# Patient Record
Sex: FEMALE | Race: WHITE | NOT HISPANIC OR LATINO | Employment: UNEMPLOYED | URBAN - METROPOLITAN AREA
[De-identification: names, ages, dates, MRNs, and addresses within clinical notes are randomized per-mention and may not be internally consistent; named-entity substitution may affect disease eponyms.]

---

## 2017-07-14 ENCOUNTER — OFFICE VISIT (OUTPATIENT)
Dept: ORTHOPEDICS | Facility: CLINIC | Age: 42
End: 2017-07-14
Payer: COMMERCIAL

## 2017-07-14 VITALS
DIASTOLIC BLOOD PRESSURE: 80 MMHG | HEIGHT: 67 IN | WEIGHT: 199 LBS | BODY MASS INDEX: 31.23 KG/M2 | HEART RATE: 75 BPM | SYSTOLIC BLOOD PRESSURE: 124 MMHG

## 2017-07-14 DIAGNOSIS — S83.282A TEAR OF LATERAL MENISCUS OF LEFT KNEE, CURRENT, UNSPECIFIED TEAR TYPE, INITIAL ENCOUNTER: Primary | ICD-10-CM

## 2017-07-14 DIAGNOSIS — M25.561 CHRONIC PAIN OF RIGHT KNEE: ICD-10-CM

## 2017-07-14 DIAGNOSIS — G89.29 CHRONIC PAIN OF RIGHT KNEE: ICD-10-CM

## 2017-07-14 DIAGNOSIS — S83.271A COMPLEX TEAR OF LATERAL MENISCUS OF RIGHT KNEE AS CURRENT INJURY, INITIAL ENCOUNTER: Primary | ICD-10-CM

## 2017-07-14 DIAGNOSIS — M23.000 CYST OF LATERAL MENISCUS OF RIGHT KNEE: ICD-10-CM

## 2017-07-14 PROCEDURE — 73562 X-RAY EXAM OF KNEE 3: CPT | Mod: RT,,, | Performed by: ORTHOPAEDIC SURGERY

## 2017-07-14 PROCEDURE — 99203 OFFICE O/P NEW LOW 30 MIN: CPT | Mod: ,,, | Performed by: ORTHOPAEDIC SURGERY

## 2017-07-14 RX ORDER — GABAPENTIN 300 MG/1
1 CAPSULE ORAL NIGHTLY
COMMUNITY

## 2017-07-14 RX ORDER — TRAMADOL HYDROCHLORIDE 50 MG/1
50 TABLET ORAL EVERY 12 HOURS PRN
Qty: 60 TABLET | Refills: 0 | Status: SHIPPED | OUTPATIENT
Start: 2017-07-14 | End: 2017-08-02

## 2017-07-14 NOTE — PROGRESS NOTES
Subjective:       Chief Complaint    Chief Complaint   Patient presents with    Knee Pain     NP, Right Knee.  Pain started approx. in 2015 with no known trauma.  Previous MRI done on 5/31/17 @ [facility] but patient did not bring the disc.  Xrays were also done but not brought to this visit.  No previous P.T.  Steroid injection was done & helped some.       HPI  Nga Horton is a 41 y.o.  female who presents With intermittent pain in the right knee. Pain is gone progressively worse. Recent MRI showed a complex tear of the lateral meniscus with a meniscal cyst. Day-to-day pain level is 8/10. She had a steroid injection in the right knee, but that did not help.      Past History  Past Medical History:   Diagnosis Date    Arthritis     Back pain     Depression     Kidney stone     Neck pain      Past Surgical History:   Procedure Laterality Date    HYSTERECTOMY      right foot  04/2004    infection    TUBAL LIGATION       Social History     Social History    Marital status:      Spouse name: N/A    Number of children: N/A    Years of education: N/A     Occupational History    Not on file.     Social History Main Topics    Smoking status: Current Every Day Smoker     Packs/day: 0.50     Types: Cigarettes    Smokeless tobacco: Never Used    Alcohol use No    Drug use: No    Sexual activity: Not on file     Other Topics Concern    Not on file     Social History Narrative    No narrative on file         Medications  Current Outpatient Prescriptions   Medication Sig    ascorbic acid (VITAMIN C) 500 MG tablet Take 500 mg by mouth once daily.    black cohosh 20 mg Tab Take 20 mg by mouth once daily.     cetirizine (ZYRTEC) 10 MG tablet Take 10 mg by mouth once daily.    cholecalciferol, vitamin D3, (VITAMIN D3) 1,000 unit capsule Take 1,000 Units by mouth once daily.    citalopram (CELEXA) 20 MG tablet Take 20 mg by mouth once daily.     ergocalciferol (VITAMIN D2) 50,000 unit Cap  Take 50,000 Units by mouth every 7 days.    estradiol (ESTRACE) 0.5 MG tablet Take 0.5 mg by mouth once daily.    fish oil-omega-3 fatty acids 300-1,000 mg capsule Take 2 g by mouth once daily.    gabapentin (NEURONTIN) 300 MG capsule Take 1 capsule by mouth every evening.    hydrocodone-acetaminophen 10-325mg (NORCO)  mg Tab Take 1 tablet by mouth 2 (two) times daily.     ibuprofen (ADVIL,MOTRIN) 800 MG tablet Take 1 tablet (800 mg total) by mouth every 8 (eight) hours as needed for Pain. (Patient taking differently: Take 800 mg by mouth every 6 (six) hours as needed for Pain. )    LACTOBACILLUS COMBO NO.13 (PROBIOTIC PEARLS COMPLETE ORAL) Take by mouth.    pravastatin (PRAVACHOL) 40 MG tablet Take 40 mg by mouth once daily.    promethazine (PHENERGAN) 25 MG tablet     vitamin A 99359 UNIT capsule Take 10,000 Units by mouth once daily.    vitamin E 1000 UNIT capsule Take 1,000 Units by mouth once daily.     Current Facility-Administered Medications   Medication    gentamicin 80 mg/100ml NACL IVPB IVPB 80 mg       Allergies  Review of patient's allergies indicates:  No Known Allergies      Review of Systems     Constitutional: Negative    HENT: Negative  Eyes: Negative  Respiratory: Negative  Cardiovascular: Negative  Musculoskeletal: HPI  Skin: Negative  Neurological: Negative  Hematological: Negative  Endocrine: Negative      Physical Exam    Vitals:    07/14/17 1337   BP: 124/80   Pulse: 75     Physical Examination:Right knee-0 120° plus cystic mass laterally. Discomfort at lateral joint line. No swelling. Stable knee. Negative Lachman's, negative pivot shift. Collateral ligaments intact. There is crepitance at the patellofemoral joint.     Skin-  General appearance -  well appearing, and in no distress  Mental status - awake  Neck - supple  Chest -  symmetric air entry  Heart - normal rate   Abdomen - soft      Assessment/Plan   Complex tear of lateral meniscus of right knee as current injury,  initial encounter    Chronic pain of right knee  -     X-Ray Knee AP LAT with De Motte Right    MRI report available-by Dr. Shyam Dixon. Complex tear of the lateral meniscus with 12 mm meniscal cyst. Plain x-rays of the right knee showed a lateral tilt to the patella on the right. The left patellofemoral joint was normal looking. No evidence of fracture or dislocation. AP view showed mild narrowing of the medial compartment. Overall, mild varus alignment to the knee.   She will be scheduled for arthroscopic procedure of the right knee and excision of the meniscal cyst.    This note was dictated using voice recognition software and may contain grammatical errors.

## 2017-07-20 DIAGNOSIS — M23.000: ICD-10-CM

## 2017-07-20 DIAGNOSIS — S83.271A COMPLEX TEAR OF LATERAL MENISCUS OF RIGHT KNEE: Primary | ICD-10-CM

## 2017-08-02 ENCOUNTER — OFFICE VISIT (OUTPATIENT)
Dept: ORTHOPEDICS | Facility: CLINIC | Age: 42
End: 2017-08-02
Payer: COMMERCIAL

## 2017-08-02 VITALS
HEIGHT: 67 IN | DIASTOLIC BLOOD PRESSURE: 75 MMHG | BODY MASS INDEX: 31.23 KG/M2 | WEIGHT: 199 LBS | HEART RATE: 72 BPM | SYSTOLIC BLOOD PRESSURE: 118 MMHG

## 2017-08-02 DIAGNOSIS — S83.271D COMPLEX TEAR OF LATERAL MENISCUS OF RIGHT KNEE AS CURRENT INJURY, SUBSEQUENT ENCOUNTER: ICD-10-CM

## 2017-08-02 DIAGNOSIS — M23.000 CYST OF LATERAL MENISCUS OF RIGHT KNEE: Primary | ICD-10-CM

## 2017-08-02 LAB
ALBUMIN SERPL-MCNC: 4.6 G/DL (ref 3.1–4.7)
ALP SERPL-CCNC: 63 IU/L (ref 40–104)
ALT (SGPT): 28 IU/L (ref 3–33)
AST SERPL-CCNC: 20 IU/L (ref 10–40)
BACTERIA SPEC CULT: ABNORMAL
BASOPHILS NFR BLD: 0 K/UL (ref 0–0.2)
BASOPHILS NFR BLD: 0.3 %
BILIRUB SERPL-MCNC: 0.7 MG/DL (ref 0.3–1)
BUN SERPL-MCNC: 11 MG/DL (ref 8–20)
CALCIUM SERPL-MCNC: 10.1 MG/DL (ref 7.7–10.4)
CHLORIDE: 103 MMOL/L (ref 98–110)
CO2 SERPL-SCNC: 31.2 MMOL/L (ref 22.8–31.6)
CREATININE: 0.52 MG/DL (ref 0.6–1.4)
EOSINOPHIL NFR BLD: 0.1 K/UL (ref 0–0.7)
EOSINOPHIL NFR BLD: 1.4 %
ERYTHROCYTE [DISTWIDTH] IN BLOOD BY AUTOMATED COUNT: 13.2 % (ref 11.7–14.9)
GLUCOSE: 95 MG/DL (ref 70–99)
GRAN #: 5.4 K/UL (ref 1.4–6.5)
GRAN%: 52.5 %
HCT VFR BLD AUTO: 42.9 % (ref 36–48)
HGB BLD-MCNC: 14.2 G/DL (ref 12–15)
IMMATURE GRANS (ABS): 0.1 K/UL (ref 0–1)
IMMATURE GRANULOCYTES: 0.6 %
LYMPH #: 3.8 K/UL (ref 1.2–3.4)
LYMPH%: 37.4 %
MCH RBC QN AUTO: 30.9 PG (ref 25–35)
MCHC RBC AUTO-ENTMCNC: 33.1 G/DL (ref 31–36)
MCV RBC AUTO: 93.5 FL (ref 79–98)
MONO #: 0.8 K/UL (ref 0.1–0.6)
MONO%: 7.8 %
NUCLEATED RBCS: 0 %
PLATELET # BLD AUTO: 319 K/UL (ref 140–440)
PMV BLD AUTO: 9.3 FL (ref 8.8–12.7)
POTASSIUM SERPL-SCNC: 4.4 MMOL/L (ref 3.5–5)
PROT SERPL-MCNC: 8 G/DL (ref 6–8.2)
RBC # BLD AUTO: 4.59 M/UL (ref 3.5–5.5)
RBC # BLD AUTO: ABNORMAL /HPF
SODIUM: 141 MMOL/L (ref 134–144)
SQUAMOUS EPITHELIAL, UA: ABNORMAL
WBC # BLD AUTO: 10.3 K/UL (ref 5–10)
WBC # BLD: ABNORMAL /HPF

## 2017-08-02 PROCEDURE — 99499 UNLISTED E&M SERVICE: CPT | Mod: ,,, | Performed by: ORTHOPAEDIC SURGERY

## 2017-08-02 RX ORDER — OXYCODONE AND ACETAMINOPHEN 10; 325 MG/1; MG/1
1 TABLET ORAL EVERY 4 HOURS PRN
Qty: 60 TABLET | Refills: 0 | Status: SHIPPED | OUTPATIENT
Start: 2017-08-02

## 2017-08-02 RX ORDER — CLONAZEPAM 0.5 MG/1
TABLET ORAL
COMMUNITY
Start: 2017-07-17

## 2017-08-02 NOTE — PROGRESS NOTES
Subjective:       Chief Complaint    Chief Complaint   Patient presents with    Pre-op Exam     Right knee- DOS will be on 08/07/2017 right knee arthroscopy with lateral meniscus repair and excision of meniscal cyst.  The purose is to relieve the pain of the right knee tear lateral meniscus with meniscal cyst.She brought her MRI disc since she didn't have it at her last visit. The report was given at her last visit.       MARLEN Horton is a 41 y.o.  female who presents Preop arthroscopy of the right knee. Has had progressive pain in the lateral aspect of her right knee over the past year. Is currently on pain management. Uses hydrocodone 10 mg to 4 times a day. Will be given Percocet 10 mg 1 every 4 hours when necessary pain for postoperative surgery.      Past History  Past Medical History:   Diagnosis Date    Arthritis     Back pain     Depression     Kidney stone     Neck pain      Past Surgical History:   Procedure Laterality Date    HYSTERECTOMY      right foot  04/2004    infection    TUBAL LIGATION       Social History     Social History    Marital status:      Spouse name: N/A    Number of children: N/A    Years of education: N/A     Occupational History    Not on file.     Social History Main Topics    Smoking status: Current Every Day Smoker     Packs/day: 0.50     Types: Cigarettes    Smokeless tobacco: Never Used    Alcohol use No    Drug use: No    Sexual activity: Not on file     Other Topics Concern    Not on file     Social History Narrative    No narrative on file         Medications  Current Outpatient Prescriptions   Medication Sig    ascorbic acid (VITAMIN C) 500 MG tablet Take 500 mg by mouth once daily.    black cohosh 20 mg Tab Take 20 mg by mouth once daily.     cetirizine (ZYRTEC) 10 MG tablet Take 10 mg by mouth once daily.    cholecalciferol, vitamin D3, (VITAMIN D3) 1,000 unit capsule Take 1,000 Units by mouth once daily.    citalopram (CELEXA) 20 MG  tablet Take 20 mg by mouth once daily.     clonazePAM (KLONOPIN) 0.5 MG tablet     ergocalciferol (VITAMIN D2) 50,000 unit Cap Take 50,000 Units by mouth every 7 days.    estradiol (ESTRACE) 0.5 MG tablet Take 0.5 mg by mouth once daily.    fish oil-omega-3 fatty acids 300-1,000 mg capsule Take 2 g by mouth once daily.    gabapentin (NEURONTIN) 300 MG capsule Take 1 capsule by mouth every evening.    hydrocodone-acetaminophen 10-325mg (NORCO)  mg Tab Take 1 tablet by mouth 2 (two) times daily.     ibuprofen (ADVIL,MOTRIN) 800 MG tablet Take 1 tablet (800 mg total) by mouth every 8 (eight) hours as needed for Pain. (Patient taking differently: Take 800 mg by mouth every 6 (six) hours as needed for Pain. )    LACTOBACILLUS COMBO NO.13 (PROBIOTIC PEARLS COMPLETE ORAL) Take by mouth.    pravastatin (PRAVACHOL) 40 MG tablet Take 40 mg by mouth once daily.    promethazine (PHENERGAN) 25 MG tablet     vitamin A 38140 UNIT capsule Take 10,000 Units by mouth once daily.    vitamin E 1000 UNIT capsule Take 1,000 Units by mouth once daily.    oxycodone-acetaminophen (PERCOCET)  mg per tablet Take 1 tablet by mouth every 4 (four) hours as needed for Pain.     Current Facility-Administered Medications   Medication    gentamicin 80 mg/100ml NACL IVPB IVPB 80 mg       Allergies  Review of patient's allergies indicates:  No Known Allergies      Review of Systems     Constitutional: Negative    HENT: Negative  Eyes: Negative  Respiratory: Negative  Cardiovascular: Negative  Musculoskeletal: HPI  Skin: Negative  Neurological: Negative  Hematological: Negative  Endocrine: Negative      Physical Exam    Vitals:    08/02/17 1004   BP: 118/75   Pulse: 72     Physical Examination:Right knee-range of motion 0-120°. Stable knee. Mild patellofemoral crepitance. Tender lateral joint line with bulging cyst.     Skin-  General appearance -  well appearing, and in no distress  Mental status - awake  Neck - supple  Chest  -  symmetric air entry  Heart - normal rate   Abdomen - soft      Assessment/Plan   Cyst of lateral meniscus of right knee    Complex tear of lateral meniscus of right knee as current injury, subsequent encounter    Other orders  -     oxycodone-acetaminophen (PERCOCET)  mg per tablet; Take 1 tablet by mouth every 4 (four) hours as needed for Pain.  Dispense: 60 tablet; Refill: 0      Procedure discussed. 12 point review of systems negative.      This note was dictated using voice recognition software and may contain grammatical errors.

## 2017-08-11 ENCOUNTER — OFFICE VISIT (OUTPATIENT)
Dept: ORTHOPEDICS | Facility: CLINIC | Age: 42
End: 2017-08-11
Payer: COMMERCIAL

## 2017-08-11 VITALS
SYSTOLIC BLOOD PRESSURE: 109 MMHG | BODY MASS INDEX: 31.23 KG/M2 | HEIGHT: 67 IN | DIASTOLIC BLOOD PRESSURE: 71 MMHG | WEIGHT: 199 LBS | HEART RATE: 88 BPM

## 2017-08-11 DIAGNOSIS — S83.271D COMPLEX TEAR OF LATERAL MENISCUS OF RIGHT KNEE AS CURRENT INJURY, SUBSEQUENT ENCOUNTER: Primary | ICD-10-CM

## 2017-08-11 DIAGNOSIS — Z98.890 POST-OPERATIVE STATE: ICD-10-CM

## 2017-08-11 PROCEDURE — 99024 POSTOP FOLLOW-UP VISIT: CPT | Mod: ,,, | Performed by: ORTHOPAEDIC SURGERY

## 2017-08-11 RX ORDER — MUPIROCIN 20 MG/G
OINTMENT TOPICAL
COMMUNITY
Start: 2017-08-04

## 2017-08-11 NOTE — PROGRESS NOTES
Subjective:       Chief Complaint    Chief Complaint   Patient presents with    Right Knee - Post-op Evaluation     4 days post-op. DOS: 8/8/2017, arthroscopy with partial arthroscopic lateral meniscectomy, right knee # 2-open excision of lateral cyst, right knee.  She is currently wearing a knee immobilizer.       MARLEN Horton is a 41 y.o.  female who presents Follow-up arthroscopy of the left knee. Appears to be doing reasonably well.       Past History  Past Medical History:   Diagnosis Date    Arthritis     Back pain     Depression     Kidney stone     Neck pain      Past Surgical History:   Procedure Laterality Date    HYSTERECTOMY      right foot  04/2004    infection    TUBAL LIGATION       Social History     Social History    Marital status:      Spouse name: N/A    Number of children: N/A    Years of education: N/A     Occupational History    Not on file.     Social History Main Topics    Smoking status: Current Every Day Smoker     Packs/day: 0.50     Types: Cigarettes    Smokeless tobacco: Never Used    Alcohol use No    Drug use: No    Sexual activity: Not on file     Other Topics Concern    Not on file     Social History Narrative    No narrative on file         Medications  Current Outpatient Prescriptions   Medication Sig    ascorbic acid (VITAMIN C) 500 MG tablet Take 500 mg by mouth once daily.    black cohosh 20 mg Tab Take 20 mg by mouth once daily.     cetirizine (ZYRTEC) 10 MG tablet Take 10 mg by mouth once daily.    cholecalciferol, vitamin D3, (VITAMIN D3) 1,000 unit capsule Take 1,000 Units by mouth once daily.    citalopram (CELEXA) 20 MG tablet Take 20 mg by mouth once daily.     clonazePAM (KLONOPIN) 0.5 MG tablet     ergocalciferol (VITAMIN D2) 50,000 unit Cap Take 50,000 Units by mouth every 7 days.    estradiol (ESTRACE) 0.5 MG tablet Take 0.5 mg by mouth once daily.    fish oil-omega-3 fatty acids 300-1,000 mg capsule Take 2 g by mouth once  daily.    gabapentin (NEURONTIN) 300 MG capsule Take 1 capsule by mouth every evening.    hydrocodone-acetaminophen 10-325mg (NORCO)  mg Tab Take 1 tablet by mouth 2 (two) times daily.     ibuprofen (ADVIL,MOTRIN) 800 MG tablet Take 1 tablet (800 mg total) by mouth every 8 (eight) hours as needed for Pain. (Patient taking differently: Take 800 mg by mouth every 6 (six) hours as needed for Pain. )    LACTOBACILLUS COMBO NO.13 (PROBIOTIC PEARLS COMPLETE ORAL) Take by mouth.    mupirocin (BACTROBAN) 2 % ointment     oxycodone-acetaminophen (PERCOCET)  mg per tablet Take 1 tablet by mouth every 4 (four) hours as needed for Pain.    pravastatin (PRAVACHOL) 40 MG tablet Take 40 mg by mouth once daily.    promethazine (PHENERGAN) 25 MG tablet     vitamin A 49043 UNIT capsule Take 10,000 Units by mouth once daily.    vitamin E 1000 UNIT capsule Take 1,000 Units by mouth once daily.     Current Facility-Administered Medications   Medication    gentamicin 80 mg/100ml NACL IVPB IVPB 80 mg       Allergies  Review of patient's allergies indicates:  No Known Allergies      Review of Systems     Constitutional: Negative    HENT: Negative  Eyes: Negative  Respiratory: Negative  Cardiovascular: Negative  Musculoskeletal: HPI  Skin: Negative  Neurological: Negative  Hematological: Negative  Endocrine: Negative      Physical Exam    Vitals:    08/11/17 0845   BP: 109/71   Pulse: 88     Physical Examination:Right knee: Actively bending the knee to 90°. Mild to moderate swelling. Able to straight leg raise.     Skin- clear  General appearance -  well appearing, and in no distress  Mental status - awake  Neck - supple  Chest -  symmetric air entry  Heart - normal rate   Abdomen - soft      Assessment/Plan   Complex tear of lateral meniscus of right knee as current injury, subsequent encounter    Post-operative state      Ambulating with a cane. Wants to discontinue the knee immobilizer. Ice recommended. Okay to wet  the knee. Once there is no drainage from the lateral incision. Advised to continue with the knee immobilizer when ambulating. Can remove for active exercises when necessary.      This note was dictated using voice recognition software and may contain grammatical errors.

## 2017-08-18 ENCOUNTER — OFFICE VISIT (OUTPATIENT)
Dept: ORTHOPEDICS | Facility: CLINIC | Age: 42
End: 2017-08-18
Payer: COMMERCIAL

## 2017-08-18 VITALS
HEIGHT: 67 IN | BODY MASS INDEX: 31.23 KG/M2 | DIASTOLIC BLOOD PRESSURE: 62 MMHG | HEART RATE: 64 BPM | WEIGHT: 199 LBS | SYSTOLIC BLOOD PRESSURE: 118 MMHG

## 2017-08-18 DIAGNOSIS — Z98.890 POST-OPERATIVE STATE: ICD-10-CM

## 2017-08-18 DIAGNOSIS — M23.000: ICD-10-CM

## 2017-08-18 DIAGNOSIS — S83.271D COMPLEX TEAR OF LATERAL MENISCUS OF RIGHT KNEE AS CURRENT INJURY, SUBSEQUENT ENCOUNTER: Primary | ICD-10-CM

## 2017-08-18 PROCEDURE — 99024 POSTOP FOLLOW-UP VISIT: CPT | Mod: ,,, | Performed by: ORTHOPAEDIC SURGERY

## 2017-08-18 NOTE — PROGRESS NOTES
Subjective:       Chief Complaint    Chief Complaint   Patient presents with    Post-op Evaluation     11 days, right knee.  DOS: 8/8/2017, arthroscopy with partial arthroscopic lateral meniscectomy, right knee # 2-open excision of lateral cyst, right knee.  Patient states her knee is doing well.  Some slight swelling but overall doing well.       HPI  Nga Horton is a 41 y.o.  female who presents  Follow-up on her right knee surgery. Overall, doing very well. Core exercises discussed.      Past History  Past Medical History:   Diagnosis Date    Arthritis     Back pain     Depression     Kidney stone     Neck pain      Past Surgical History:   Procedure Laterality Date    HYSTERECTOMY      KNEE SURGERY Right 08/08/2017    arthroscopy with partial arthroscopic lateral meniscectomy, right knee # 2-open excision of lateral cyst, right knee    right foot  04/2004    infection    TUBAL LIGATION       Social History     Social History    Marital status:      Spouse name: N/A    Number of children: N/A    Years of education: N/A     Occupational History    Not on file.     Social History Main Topics    Smoking status: Current Every Day Smoker     Packs/day: 0.50     Types: Cigarettes    Smokeless tobacco: Never Used    Alcohol use No    Drug use: No    Sexual activity: Not on file     Other Topics Concern    Not on file     Social History Narrative    No narrative on file         Medications  Current Outpatient Prescriptions   Medication Sig    ascorbic acid (VITAMIN C) 500 MG tablet Take 500 mg by mouth once daily.    black cohosh 20 mg Tab Take 20 mg by mouth once daily.     cetirizine (ZYRTEC) 10 MG tablet Take 10 mg by mouth once daily.    cholecalciferol, vitamin D3, (VITAMIN D3) 1,000 unit capsule Take 1,000 Units by mouth once daily.    citalopram (CELEXA) 20 MG tablet Take 20 mg by mouth once daily.     clonazePAM (KLONOPIN) 0.5 MG tablet     ergocalciferol (VITAMIN D2) 50,000  unit Cap Take 50,000 Units by mouth every 7 days.    estradiol (ESTRACE) 0.5 MG tablet Take 0.5 mg by mouth once daily.    fish oil-omega-3 fatty acids 300-1,000 mg capsule Take 2 g by mouth once daily.    gabapentin (NEURONTIN) 300 MG capsule Take 1 capsule by mouth every evening.    hydrocodone-acetaminophen 10-325mg (NORCO)  mg Tab Take 1 tablet by mouth 2 (two) times daily.     ibuprofen (ADVIL,MOTRIN) 800 MG tablet Take 1 tablet (800 mg total) by mouth every 8 (eight) hours as needed for Pain. (Patient taking differently: Take 800 mg by mouth every 6 (six) hours as needed for Pain. )    LACTOBACILLUS COMBO NO.13 (PROBIOTIC PEARLS COMPLETE ORAL) Take by mouth.    mupirocin (BACTROBAN) 2 % ointment     oxycodone-acetaminophen (PERCOCET)  mg per tablet Take 1 tablet by mouth every 4 (four) hours as needed for Pain.    pravastatin (PRAVACHOL) 40 MG tablet Take 40 mg by mouth once daily.    promethazine (PHENERGAN) 25 MG tablet     vitamin A 27527 UNIT capsule Take 10,000 Units by mouth once daily.    vitamin E 1000 UNIT capsule Take 1,000 Units by mouth once daily.     Current Facility-Administered Medications   Medication    gentamicin 80 mg/100ml NACL IVPB IVPB 80 mg       Allergies  Review of patient's allergies indicates:  No Known Allergies      Review of Systems     Constitutional: Negative    HENT: Negative  Eyes: Negative  Respiratory: Negative  Cardiovascular: Negative  Musculoskeletal: HPI  Skin: Negative  Neurological: Negative  Hematological: Negative  Endocrine: Negative      Physical Exam    Vitals:    08/18/17 1311   BP: 118/62   Pulse: 64     Physical Examination:Examination right knee reveals healed puncture sites and lateral incision. No active drainage. Range of motion 0-124+ degrees. Minimal swelling. Sutures present.     Skin-Clear  General appearance -  well appearing, and in no distress  Mental status - awake  Neck - supple  Chest -  symmetric air entry  Heart -  normal rate   Abdomen - soft      Assessment/Plan   Complex tear of lateral meniscus of right knee as current injury, subsequent encounter    Post-operative state    Cyst of lateral meniscus, right      Sutures removed. Encouraged to do. Core exercise, elliptical stationary bike Pool exercises.      This note was dictated using voice recognition software and may contain grammatical errors.

## 2017-08-21 ENCOUNTER — DOCUMENTATION ONLY (OUTPATIENT)
Dept: ORTHOPEDICS | Facility: CLINIC | Age: 42
End: 2017-08-21

## 2017-08-21 NOTE — PROGRESS NOTES
Patient here to have a single stitch removed that appears after her last appointment.  Stitch was removed w/ the assistance of Dr. Raygoza.

## 2019-10-18 DIAGNOSIS — M25.562 PAIN IN BOTH KNEES, UNSPECIFIED CHRONICITY: Primary | ICD-10-CM

## 2019-10-18 DIAGNOSIS — M25.561 PAIN IN BOTH KNEES, UNSPECIFIED CHRONICITY: Primary | ICD-10-CM

## 2020-06-29 DIAGNOSIS — M25.561 RIGHT KNEE PAIN, UNSPECIFIED CHRONICITY: Primary | ICD-10-CM

## 2020-08-19 ENCOUNTER — HOSPITAL ENCOUNTER (EMERGENCY)
Facility: HOSPITAL | Age: 45
Discharge: HOME | End: 2020-08-19
Attending: EMERGENCY MEDICINE
Payer: COMMERCIAL

## 2020-08-19 VITALS
OXYGEN SATURATION: 98 % | RESPIRATION RATE: 18 BRPM | BODY MASS INDEX: 31.39 KG/M2 | WEIGHT: 200 LBS | TEMPERATURE: 98 F | HEIGHT: 67 IN | DIASTOLIC BLOOD PRESSURE: 75 MMHG | HEART RATE: 90 BPM | SYSTOLIC BLOOD PRESSURE: 125 MMHG

## 2020-08-19 DIAGNOSIS — R30.0 DYSURIA: Primary | ICD-10-CM

## 2020-08-19 LAB
BACTERIA #/AREA URNS HPF: NORMAL /HPF
BACTERIA GENITAL QL WET PREP: ABNORMAL
BILIRUB UR QL STRIP: NEGATIVE
CLARITY UR: CLEAR
CLUE CELLS VAG QL WET PREP: ABNORMAL
COLOR UR: YELLOW
FILAMENT FUNGI VAG WET PREP-#/AREA: ABNORMAL
GLUCOSE UR QL STRIP: NEGATIVE
HGB UR QL STRIP: NEGATIVE
KETONES UR QL STRIP: NEGATIVE
LEUKOCYTE ESTERASE UR QL STRIP: ABNORMAL
MICROSCOPIC COMMENT: NORMAL
NITRITE UR QL STRIP: NEGATIVE
PH UR STRIP: 7 [PH] (ref 5–8)
PROT UR QL STRIP: NEGATIVE
SP GR UR STRIP: <=1.005 (ref 1–1.03)
SPECIMEN SOURCE: ABNORMAL
SQUAMOUS #/AREA URNS HPF: 2 /HPF
T VAGINALIS GENITAL QL WET PREP: ABNORMAL
URN SPEC COLLECT METH UR: ABNORMAL
UROBILINOGEN UR STRIP-ACNC: NEGATIVE EU/DL
WBC #/AREA URNS HPF: 1 /HPF (ref 0–5)
WBC #/AREA VAG WET PREP: ABNORMAL
YEAST GENITAL QL WET PREP: ABNORMAL

## 2020-08-19 PROCEDURE — 87591 N.GONORRHOEAE DNA AMP PROB: CPT

## 2020-08-19 PROCEDURE — 87210 SMEAR WET MOUNT SALINE/INK: CPT

## 2020-08-19 PROCEDURE — 81000 URINALYSIS NONAUTO W/SCOPE: CPT

## 2020-08-19 PROCEDURE — 99284 EMERGENCY DEPT VISIT MOD MDM: CPT

## 2020-08-19 NOTE — ED PROVIDER NOTES
Encounter Date: 8/19/2020    SCRIBE #1 NOTE: I, Madelin Yun, am scribing for, and in the presence of, Jose Alberto Boyd MD.       History     Chief Complaint   Patient presents with    Dysuria     C/o pain after urinating     Time seen by provider: 12:21 PM on 08/19/2020    Chief Complaint: Dysuria    Nga Horton is a 44 y.o. female who presents to the ED with an onset of dysuria. The patient complains of pain with urination, foul smelling urine, difficulty emptying bladder, and increase urgency x3 days. The patient endorses drinking cranberry juice and taking Azo with no relief. The patient admits to sexual intercourse a few days prior to symptom onset. The patient denies hematuria, nausea, vomiting, diarrhea, vaginal discharge, fever or any other symptoms at this time. PMHx of kidney stone. PSHx of tubal ligation, hysterectomy, and kidney stent placement.      The history is provided by the patient.     Review of patient's allergies indicates:  No Known Allergies  Past Medical History:   Diagnosis Date    Arthritis     Back pain     Depression     Kidney stone     Neck pain      Past Surgical History:   Procedure Laterality Date    HYSTERECTOMY      KNEE SURGERY Right 08/08/2017    arthroscopy with partial arthroscopic lateral meniscectomy, right knee # 2-open excision of lateral cyst, right knee    right foot  04/2004    infection    TUBAL LIGATION       Family History   Problem Relation Age of Onset    Heart disease Mother     Diabetes Mother     Cancer Father      Social History     Tobacco Use    Smoking status: Current Every Day Smoker     Packs/day: 0.50     Types: Cigarettes    Smokeless tobacco: Never Used   Substance Use Topics    Alcohol use: No    Drug use: No     Review of Systems   Constitutional: Negative for activity change, appetite change, chills, fatigue and fever.   Eyes: Negative for visual disturbance.   Respiratory: Negative for apnea and shortness of breath.     Cardiovascular: Negative for chest pain and palpitations.   Gastrointestinal: Negative for abdominal distention, abdominal pain, diarrhea, nausea and vomiting.   Genitourinary: Positive for difficulty urinating, dysuria and urgency. Negative for hematuria and vaginal discharge.   Musculoskeletal: Negative for neck pain.   Skin: Negative for pallor and rash.   Neurological: Negative for headaches.   Hematological: Does not bruise/bleed easily.   Psychiatric/Behavioral: Negative for agitation.       Physical Exam     Initial Vitals [08/19/20 1218]   BP Pulse Resp Temp SpO2   125/75 90 18 98 °F (36.7 °C) 98 %      MAP       --         Physical Exam    Nursing note and vitals reviewed.  Constitutional: She appears well-developed and well-nourished.   HENT:   Head: Normocephalic and atraumatic.   Eyes: Conjunctivae are normal.   Neck: Normal range of motion. Neck supple.   Cardiovascular: Normal rate, regular rhythm and normal heart sounds. Exam reveals no gallop and no friction rub.    No murmur heard.  Pulmonary/Chest: Effort normal and breath sounds normal. No respiratory distress. She has no wheezes. She has no rhonchi. She has no rales.   Abdominal: Soft. She exhibits no distension. There is abdominal tenderness in the suprapubic area.   Genitourinary:    Genitourinary Comments: Female chaperone present (MACIEJ More).     Musculoskeletal: Normal range of motion.   Neurological: She is alert and oriented to person, place, and time.   Skin: Skin is warm and dry. No erythema.   Psychiatric: She has a normal mood and affect.         ED Course   Procedures  Labs Reviewed   URINALYSIS, REFLEX TO URINE CULTURE - Abnormal; Notable for the following components:       Result Value    Specific Gravity, UA <=1.005 (*)     Leukocytes, UA Trace (*)     All other components within normal limits    Narrative:     Specimen Source->Urine   C. TRACHOMATIS/N. GONORRHOEAE BY AMP DNA   URINALYSIS MICROSCOPIC    Narrative:     Specimen  Source->Urine          Imaging Results    None          Medical Decision Making:   History:   Old Medical Records: I decided to obtain old medical records.  Clinical Tests:   Lab Tests: Ordered and Reviewed  ED Management:  44-year-old female presents with dysuria.  Urinalysis fails to demonstrate any definitive evidence of UTI.  She does have a white discharge and has had unprotected intercourse raising concerns for possible urethritis.  Gonorrhea and chlamydia specimens are submitted.  She is offered empiric treatment but prefers to wait for results.  Wet prep fails to demonstrate any evidence of vaginitis or Candida infection.       APC / Resident Notes:   I, Dr. Jose Alberto Boyd III, personally performed the services described in this documentation. All medical record entries made by the scribe were at my direction and in my presence.  I have reviewed the chart and agree that the record reflects my personal performance and is accurate and complete       Scribe Attestation:   Scribe #1: I performed the above scribed service and the documentation accurately describes the services I performed. I attest to the accuracy of the note.                          Clinical Impression:       ICD-10-CM ICD-9-CM   1. Dysuria  R30.0 788.1                                Jose Alberto Boyd III, MD  08/19/20 1529

## 2020-08-19 NOTE — ED NOTES
Presents to room 14 with complaints of urinary symptoms for 1 week Denies any other complaints NAD noted Dr Boyd in to examine pt clean catch UA obtained and sent to lab

## 2020-08-22 LAB
C TRACH RRNA SPEC QL NAA+PROBE: NEGATIVE
N GONORRHOEA RRNA SPEC QL NAA+PROBE: NEGATIVE

## 2022-12-28 ENCOUNTER — HOSPITAL ENCOUNTER (EMERGENCY)
Facility: HOSPITAL | Age: 47
Discharge: HOME | End: 2022-12-28
Attending: EMERGENCY MEDICINE
Payer: COMMERCIAL

## 2022-12-28 VITALS
SYSTOLIC BLOOD PRESSURE: 130 MMHG | HEIGHT: 67 IN | WEIGHT: 200 LBS | DIASTOLIC BLOOD PRESSURE: 74 MMHG | RESPIRATION RATE: 16 BRPM | TEMPERATURE: 98 F | BODY MASS INDEX: 31.39 KG/M2 | HEART RATE: 75 BPM | OXYGEN SATURATION: 99 %

## 2022-12-28 DIAGNOSIS — M72.2 PLANTAR FASCIITIS: Primary | ICD-10-CM

## 2022-12-28 PROCEDURE — 99283 EMERGENCY DEPT VISIT LOW MDM: CPT

## 2022-12-28 PROCEDURE — 25000003 PHARM REV CODE 250: Performed by: EMERGENCY MEDICINE

## 2022-12-28 RX ORDER — NAPROXEN 250 MG/1
500 TABLET ORAL
Status: COMPLETED | OUTPATIENT
Start: 2022-12-28 | End: 2022-12-28

## 2022-12-28 RX ORDER — NAPROXEN 500 MG/1
500 TABLET ORAL 2 TIMES DAILY WITH MEALS
Qty: 60 TABLET | Refills: 0 | Status: SHIPPED | OUTPATIENT
Start: 2022-12-28

## 2022-12-28 RX ADMIN — NAPROXEN 500 MG: 250 TABLET ORAL at 11:12

## 2022-12-29 ENCOUNTER — TELEPHONE (OUTPATIENT)
Dept: FAMILY MEDICINE | Facility: CLINIC | Age: 47
End: 2022-12-29
Payer: COMMERCIAL

## 2022-12-29 NOTE — ED PROVIDER NOTES
Encounter Date: 12/28/2022    SCRIBE #1 NOTE: IBijan, am scribing for, and in the presence of,  Joby Rojas MD.     History     Chief Complaint   Patient presents with    Foot Pain     Right foot pain hurts every morning     Time seen by provider: 10:59 PM on 12/28/2022    Nga Horton is a 47 y.o. female who presents to the ED with an onset of left foot pain that has persisted for approximately 6 months. The patient denies any other symptoms at this time. She states the pain is worst in the morning when she wakes up. The patient hit a stump with her left heel over this last summer and states that is around the time she began having the pain. She notes that she has began walking more frequently to exercise. She states that whenever she lays down to sleep at night she frequently experiences cramping to her calves. PMHx of arthritis. There is no pertinent PSHx.    The history is provided by the patient.   Review of patient's allergies indicates:  No Known Allergies  Past Medical History:   Diagnosis Date    Arthritis     Back pain     Depression     Kidney stone     Neck pain      Past Surgical History:   Procedure Laterality Date    HYSTERECTOMY      KNEE SURGERY Right 08/08/2017    arthroscopy with partial arthroscopic lateral meniscectomy, right knee # 2-open excision of lateral cyst, right knee    right foot  04/2004    infection    TUBAL LIGATION       Family History   Problem Relation Age of Onset    Heart disease Mother     Diabetes Mother     Cancer Father      Social History     Tobacco Use    Smoking status: Every Day     Packs/day: 0.50     Types: Cigarettes    Smokeless tobacco: Never   Substance Use Topics    Alcohol use: No    Drug use: No     Review of Systems   Constitutional:  Negative for fever.   HENT:  Negative for sore throat.    Respiratory:  Negative for shortness of breath.    Cardiovascular:  Negative for chest pain.   Gastrointestinal:  Negative for nausea.   Genitourinary:   Negative for dysuria.   Musculoskeletal:  Positive for myalgias. Negative for back pain.   Skin:  Negative for rash.   Neurological:  Negative for weakness.   Hematological:  Does not bruise/bleed easily.     Physical Exam     Initial Vitals [12/28/22 2231]   BP Pulse Resp Temp SpO2   (!) 151/89 88 16 98 °F (36.7 °C) 98 %      MAP       --         Physical Exam    Nursing note and vitals reviewed.  Constitutional: Vital signs are normal. She appears well-developed and well-nourished.  Non-toxic appearance. No distress.   HENT:   Head: Normocephalic and atraumatic.   Eyes: EOM are normal. Pupils are equal, round, and reactive to light.   Neck: Neck supple. No JVD present.   Normal range of motion.  Cardiovascular:  Normal rate, regular rhythm, normal heart sounds and intact distal pulses.     Exam reveals no gallop and no friction rub.       No murmur heard.  Pulmonary/Chest: Breath sounds normal. She has no wheezes. She has no rhonchi. She has no rales.   Abdominal: Abdomen is soft. Bowel sounds are normal. There is no abdominal tenderness. There is no rebound and no guarding.   Musculoskeletal:         General: Normal range of motion.      Cervical back: Normal range of motion and neck supple. No rigidity.      Left foot: Tenderness (to plantar fascia insertion on the calcaneus) present.     Neurological: She is alert and oriented to person, place, and time. She has normal strength and normal reflexes. No cranial nerve deficit or sensory deficit. She exhibits normal muscle tone. Coordination normal. GCS eye subscore is 4. GCS verbal subscore is 5. GCS motor subscore is 6.   Skin: Skin is warm and dry.   Psychiatric: She has a normal mood and affect. Her speech is normal and behavior is normal. She is not actively hallucinating.       ED Course   Procedures  Labs Reviewed - No data to display         Imaging Results              X-Ray Foot Complete Left (Final result)  Result time 12/28/22 23:37:22      Final result  by Bruce Leon DO (12/28/22 23:37:22)                   Impression:      No acute osseous abnormality.      Electronically signed by: Bruce Leon  Date:    12/28/2022  Time:    23:37               Narrative:    EXAMINATION:  XR FOOT COMPLETE 3 VIEW LEFT    CLINICAL HISTORY:  .  Plantar fascial fibromatosis    TECHNIQUE:  AP, lateral and oblique views of the left foot were performed.    COMPARISON:  None    FINDINGS:  No acute fracture or dislocation. Alignment is normal. The Lisfranc articulation is congruent. Joint spaces are preserved.  There is an os peroneum.  There is a plantar calcaneal enthesophyte.                                       Medications   naproxen tablet 500 mg (500 mg Oral Given 12/28/22 2309)     Medical Decision Making:   History:   Old Medical Records: I decided to obtain old medical records.  Initial Assessment:   47-year-old woman with heel pain, worse in the mornings.  Tenderness of the heel on examination without any evidence of infection.  X-ray showed no evidence of fracture or acute osseous abnormality.  No trauma.  I believe she has plantar fasciitis.  Discussed exercises for this and will refer to Podiatry.  Naproxen and ice.  Return precautions discussed.  Discharged in no acute distress.  Clinical Tests:   Radiological Study: Ordered and Reviewed        Scribe Attestation:   Scribe #1: I performed the above scribed service and the documentation accurately describes the services I performed. I attest to the accuracy of the note.            I, Bob Camacho, personally performed the services described in this documentation. All medical record entries made by the scribe were at my direction and in my presence.  I have reviewed the chart and agree that the record reflects my personal performance and is accurate and complete. Joby Rojas MD.       Clinical Impression:   Final diagnoses:  [M72.2] Plantar fasciitis (Primary)        ED Disposition Condition    Discharge  Stable          ED Prescriptions       Medication Sig Dispense Start Date End Date Auth. Provider    naproxen (NAPROSYN) 500 MG tablet Take 1 tablet (500 mg total) by mouth 2 (two) times daily with meals. 60 tablet 12/28/2022 -- Joby oRjas MD          Follow-up Information    None          Joby Rojas MD  12/29/22 2036

## 2023-01-03 ENCOUNTER — TELEPHONE (OUTPATIENT)
Dept: FAMILY MEDICINE | Facility: CLINIC | Age: 48
End: 2023-01-03
Payer: COMMERCIAL

## 2024-05-16 ENCOUNTER — HOSPITAL ENCOUNTER (EMERGENCY)
Facility: HOSPITAL | Age: 49
Discharge: HOME | End: 2024-05-16
Attending: EMERGENCY MEDICINE
Payer: COMMERCIAL

## 2024-05-16 VITALS
DIASTOLIC BLOOD PRESSURE: 84 MMHG | OXYGEN SATURATION: 99 % | BODY MASS INDEX: 29.03 KG/M2 | HEART RATE: 81 BPM | TEMPERATURE: 98 F | SYSTOLIC BLOOD PRESSURE: 137 MMHG | WEIGHT: 185 LBS | HEIGHT: 67 IN | RESPIRATION RATE: 18 BRPM

## 2024-05-16 DIAGNOSIS — K08.89 PAIN, DENTAL: Primary | ICD-10-CM

## 2024-05-16 PROCEDURE — 99284 EMERGENCY DEPT VISIT MOD MDM: CPT

## 2024-05-16 RX ORDER — PENICILLIN V POTASSIUM 500 MG/1
500 TABLET, FILM COATED ORAL 4 TIMES DAILY
Qty: 28 TABLET | Refills: 0 | Status: SHIPPED | OUTPATIENT
Start: 2024-05-16 | End: 2024-05-23

## 2024-05-16 RX ORDER — HYDROCODONE BITARTRATE AND ACETAMINOPHEN 5; 325 MG/1; MG/1
1 TABLET ORAL EVERY 6 HOURS PRN
Qty: 10 TABLET | Refills: 0 | Status: SHIPPED | OUTPATIENT
Start: 2024-05-16

## 2024-05-16 NOTE — ED PROVIDER NOTES
Encounter Date: 5/16/2024       History     Chief Complaint   Patient presents with    Dental Pain     Right sided dental pain x 2 days      48-year-old female presents with right-sided dental pain for several days.  No trouble swallowing.  Patient states right posterior to with his painful, recently a filling came out and the pain started after this.  Feels a lump in her right jaw now.    The history is provided by the patient.     Review of patient's allergies indicates:  No Known Allergies  Past Medical History:   Diagnosis Date    Arthritis     Back pain     Depression     Kidney stone     Neck pain      Past Surgical History:   Procedure Laterality Date    HYSTERECTOMY      KNEE SURGERY Right 08/08/2017    arthroscopy with partial arthroscopic lateral meniscectomy, right knee # 2-open excision of lateral cyst, right knee    right foot  04/2004    infection    TUBAL LIGATION       Family History   Problem Relation Name Age of Onset    Heart disease Mother      Diabetes Mother      Cancer Father       Social History     Tobacco Use    Smoking status: Every Day     Current packs/day: 0.50     Types: Cigarettes    Smokeless tobacco: Never   Substance Use Topics    Alcohol use: No    Drug use: No     Review of Systems   Constitutional: Negative.    HENT:  Positive for dental problem. Negative for trouble swallowing.        Physical Exam     Initial Vitals [05/16/24 1622]   BP Pulse Resp Temp SpO2   137/84 81 18 97.8 °F (36.6 °C) 99 %      MAP       --         Physical Exam    Nursing note and vitals reviewed.  Constitutional: She appears well-developed and well-nourished.   HENT:   Head: Normocephalic and atraumatic.   Multiple dental caries, right posterior molar with a large cavity, no abscess.  Floor of mouth soft and pink without tenderness.  No evidence of Zander's angina.   Eyes: EOM are normal.   Neck: Neck supple.   Normal range of motion.  Pulmonary/Chest: No respiratory distress.   Musculoskeletal:          General: Normal range of motion.      Cervical back: Normal range of motion and neck supple.     Neurological: She is alert and oriented to person, place, and time.   Skin: Skin is warm and dry.   Psychiatric: She has a normal mood and affect. Her behavior is normal. Judgment and thought content normal.         ED Course   Procedures  Labs Reviewed - No data to display       Imaging Results    None          Medications - No data to display  Medical Decision Making  Patient with dental pain without evidence of abscess.  Multiple caries, she will be placed on p.o. antibiotics.  No evidence of Lemierre syndrome or Zander's angina.  No indication for imaging.  She will be discharged home with p.o. antibiotics and pain medication.  Follow up with a dentist as planned return to the ER for any concerns.    Risk  Prescription drug management.               ED Course as of 05/16/24 1738   Thu May 16, 2024   1725 BP: 137/84 [EF]   1725 Temp: 97.8 °F (36.6 °C) [EF]   1725 Temp Source: Oral [EF]   1725 Pulse: 81 [EF]   1725 Resp: 18 [EF]   1725 SpO2: 99 % [EF]      ED Course User Index  [EF] Compa Fuentes MD                           Clinical Impression:  Final diagnoses:  [K08.89] Pain, dental (Primary)          ED Disposition Condition    Discharge Stable          ED Prescriptions       Medication Sig Dispense Start Date End Date Auth. Provider    penicillin v potassium (VEETID) 500 MG tablet Take 1 tablet (500 mg total) by mouth 4 (four) times daily. for 7 days 28 tablet 5/16/2024 5/23/2024 Compa Fuentes MD    HYDROcodone-acetaminophen (NORCO) 5-325 mg per tablet Take 1 tablet by mouth every 6 (six) hours as needed for Pain. 10 tablet 5/16/2024 -- Compa Fuentes MD          Follow-up Information       Follow up With Specialties Details Why Contact Info Additional Information    [place] [facility] as planned         [facility] -  Emergency Medicine  As needed, If symptoms worsen [address] [place] [id number] 1st floor             Compa Fuentes MD  05/16/24 0049

## 2024-05-16 NOTE — FIRST PROVIDER EVALUATION
Emergency Department TeleTriage Encounter Note      CHIEF COMPLAINT    Chief Complaint   Patient presents with    Dental Pain     Right sided dental pain x 2 days        VITAL SIGNS   Initial Vitals [05/16/24 1622]   BP Pulse Resp Temp SpO2   137/84 81 18 97.8 °F (36.6 °C) 99 %      MAP       --            ALLERGIES    Review of patient's allergies indicates:  No Known Allergies    PROVIDER TRIAGE NOTE  Patient presents with complaint of pain to tooth and tongue after a fractured tooth. Denied fever. Reports possible facial swelling. Does not have insurance/resources for dentist.      Phy:   Constitutional: well nourished, well developed, appearing stated age, NAD        Initial orders will be placed and care will be transferred to an alternate provider when patient is roomed for a full evaluation. Any additional orders and the final disposition will be determined by that provider.        ORDERS  Labs Reviewed - No data to display    ED Orders (720h ago, onward)      None              Virtual Visit Note: The provider triage portion of this emergency department evaluation and documentation was performed via #waywire, a HIPAA-compliant telemedicine application, in concert with a tele-presenter in the room. A face to face patient evaluation with one of my colleagues will occur once the patient is placed in an emergency department room.      DISCLAIMER: This note was prepared with [employer] voice recognition transcription software. Garbled syntax, mangled pronouns, and other bizarre constructions may be attributed to that software system.

## 2024-05-29 ENCOUNTER — HOSPITAL ENCOUNTER (EMERGENCY)
Facility: HOSPITAL | Age: 49
Discharge: HOME | End: 2024-05-29
Attending: EMERGENCY MEDICINE
Payer: COMMERCIAL

## 2024-05-29 VITALS
OXYGEN SATURATION: 99 % | WEIGHT: 180 LBS | DIASTOLIC BLOOD PRESSURE: 88 MMHG | SYSTOLIC BLOOD PRESSURE: 124 MMHG | RESPIRATION RATE: 16 BRPM | BODY MASS INDEX: 28.25 KG/M2 | TEMPERATURE: 98 F | HEIGHT: 67 IN | HEART RATE: 80 BPM

## 2024-05-29 DIAGNOSIS — N39.0 URINARY TRACT INFECTION WITHOUT HEMATURIA, SITE UNSPECIFIED: Primary | ICD-10-CM

## 2024-05-29 LAB
ALBUMIN SERPL BCP-MCNC: 4.2 G/DL (ref 3.5–5.2)
ALP SERPL-CCNC: 95 U/L (ref 55–135)
ALT SERPL W/O P-5'-P-CCNC: 30 U/L (ref 10–44)
ANION GAP SERPL CALC-SCNC: 13 MMOL/L (ref 8–16)
AST SERPL-CCNC: 18 U/L (ref 10–40)
BACTERIA #/AREA URNS HPF: ABNORMAL /HPF
BASOPHILS # BLD AUTO: 0.07 K/UL (ref 0–0.2)
BASOPHILS NFR BLD: 0.6 % (ref 0–1.9)
BILIRUB SERPL-MCNC: 0.5 MG/DL (ref 0.1–1)
BILIRUB UR QL STRIP: NEGATIVE
BUN SERPL-MCNC: 17 MG/DL (ref 6–20)
CALCIUM SERPL-MCNC: 9.9 MG/DL (ref 8.7–10.5)
CHLORIDE SERPL-SCNC: 105 MMOL/L (ref 95–110)
CLARITY UR: ABNORMAL
CO2 SERPL-SCNC: 26 MMOL/L (ref 23–29)
COLOR UR: YELLOW
CREAT SERPL-MCNC: 0.7 MG/DL (ref 0.5–1.4)
DIFFERENTIAL METHOD BLD: ABNORMAL
EOSINOPHIL # BLD AUTO: 0.2 K/UL (ref 0–0.5)
EOSINOPHIL NFR BLD: 1.6 % (ref 0–8)
ERYTHROCYTE [DISTWIDTH] IN BLOOD BY AUTOMATED COUNT: 12.1 % (ref 11.5–14.5)
EST. GFR  (NO RACE VARIABLE): >60 ML/MIN/1.73 M^2
GLUCOSE SERPL-MCNC: 92 MG/DL (ref 70–110)
GLUCOSE UR QL STRIP: NEGATIVE
HCT VFR BLD AUTO: 42.7 % (ref 37–48.5)
HGB BLD-MCNC: 14.5 G/DL (ref 12–16)
HGB UR QL STRIP: ABNORMAL
IMM GRANULOCYTES # BLD AUTO: 0.02 K/UL (ref 0–0.04)
IMM GRANULOCYTES NFR BLD AUTO: 0.2 % (ref 0–0.5)
KETONES UR QL STRIP: NEGATIVE
LEUKOCYTE ESTERASE UR QL STRIP: ABNORMAL
LIPASE SERPL-CCNC: 25 U/L (ref 4–60)
LYMPHOCYTES # BLD AUTO: 4.5 K/UL (ref 1–4.8)
LYMPHOCYTES NFR BLD: 41.4 % (ref 18–48)
MCH RBC QN AUTO: 30.9 PG (ref 27–31)
MCHC RBC AUTO-ENTMCNC: 34 G/DL (ref 32–36)
MCV RBC AUTO: 91 FL (ref 82–98)
MICROSCOPIC COMMENT: ABNORMAL
MONOCYTES # BLD AUTO: 0.8 K/UL (ref 0.3–1)
MONOCYTES NFR BLD: 7 % (ref 4–15)
NEUTROPHILS # BLD AUTO: 5.4 K/UL (ref 1.8–7.7)
NEUTROPHILS NFR BLD: 49.2 % (ref 38–73)
NITRITE UR QL STRIP: NEGATIVE
NRBC BLD-RTO: 0 /100 WBC
PH UR STRIP: 7 [PH] (ref 5–8)
PLATELET # BLD AUTO: 360 K/UL (ref 150–450)
PMV BLD AUTO: 8.4 FL (ref 9.2–12.9)
POTASSIUM SERPL-SCNC: 4.4 MMOL/L (ref 3.5–5.1)
PROT SERPL-MCNC: 7.8 G/DL (ref 6–8.4)
PROT UR QL STRIP: NEGATIVE
RBC # BLD AUTO: 4.69 M/UL (ref 4–5.4)
RBC #/AREA URNS HPF: 9 /HPF (ref 0–4)
SODIUM SERPL-SCNC: 144 MMOL/L (ref 136–145)
SP GR UR STRIP: 1 (ref 1–1.03)
SQUAMOUS #/AREA URNS HPF: 3 /HPF
URN SPEC COLLECT METH UR: ABNORMAL
UROBILINOGEN UR STRIP-ACNC: NEGATIVE EU/DL
WBC # BLD AUTO: 10.97 K/UL (ref 3.9–12.7)
WBC #/AREA URNS HPF: 4 /HPF (ref 0–5)

## 2024-05-29 PROCEDURE — 81000 URINALYSIS NONAUTO W/SCOPE: CPT | Performed by: EMERGENCY MEDICINE

## 2024-05-29 PROCEDURE — 85025 COMPLETE CBC W/AUTO DIFF WBC: CPT | Performed by: EMERGENCY MEDICINE

## 2024-05-29 PROCEDURE — 83690 ASSAY OF LIPASE: CPT | Performed by: EMERGENCY MEDICINE

## 2024-05-29 PROCEDURE — 99283 EMERGENCY DEPT VISIT LOW MDM: CPT

## 2024-05-29 PROCEDURE — 36415 COLL VENOUS BLD VENIPUNCTURE: CPT | Performed by: EMERGENCY MEDICINE

## 2024-05-29 PROCEDURE — 80053 COMPREHEN METABOLIC PANEL: CPT | Performed by: EMERGENCY MEDICINE

## 2024-05-29 RX ORDER — CEFUROXIME AXETIL 500 MG/1
500 TABLET ORAL EVERY 12 HOURS
Qty: 10 TABLET | Refills: 0 | Status: SHIPPED | OUTPATIENT
Start: 2024-05-29 | End: 2024-06-03

## 2024-05-30 NOTE — ED PROVIDER NOTES
Encounter Date: 5/29/2024       History     Chief Complaint   Patient presents with    urinary problems      Patient states she she feels like she is not urinating enough, states she is only going 1-2 times a day for a few months      Patient presents complaining of frequency and urgency.  Patient having dysuria.  Patient concerned about decreased urine output.  At the worst symptoms are mild-to-moderate.  Nothing makes it better or worse      Review of patient's allergies indicates:  No Known Allergies  Past Medical History:   Diagnosis Date    Arthritis     Back pain     Depression     Kidney stone     Neck pain      Past Surgical History:   Procedure Laterality Date    HYSTERECTOMY      KNEE SURGERY Right 08/08/2017    arthroscopy with partial arthroscopic lateral meniscectomy, right knee # 2-open excision of lateral cyst, right knee    right foot  04/2004    infection    TUBAL LIGATION       Family History   Problem Relation Name Age of Onset    Heart disease Mother      Diabetes Mother      Cancer Father       Social History     Tobacco Use    Smoking status: Every Day     Current packs/day: 0.50     Types: Cigarettes    Smokeless tobacco: Never   Substance Use Topics    Alcohol use: No    Drug use: No     Review of Systems   All other systems reviewed and are negative.      Physical Exam     Initial Vitals [05/29/24 1331]   BP Pulse Resp Temp SpO2   135/83 85 19 98.4 °F (36.9 °C) 100 %      MAP       --         Physical Exam    Nursing note and vitals reviewed.  Constitutional: She appears well-developed and well-nourished.   Pleasant, polite   HENT:   Head: Normocephalic and atraumatic.   Eyes: EOM are normal.   Neck: Neck supple.   Normal range of motion.  Cardiovascular:  Intact distal pulses.           Pulmonary/Chest: No respiratory distress.   Musculoskeletal:      Cervical back: Normal range of motion and neck supple.     Neurological: She is alert and oriented to person, place, and time.   Skin: Skin  is warm and dry. Capillary refill takes less than 2 seconds.   Psychiatric: She has a normal mood and affect. Her behavior is normal. Judgment and thought content normal.         ED Course   Procedures  Labs Reviewed   CBC W/ AUTO DIFFERENTIAL - Abnormal; Notable for the following components:       Result Value    MPV 8.4 (*)     All other components within normal limits   URINALYSIS, REFLEX TO URINE CULTURE - Abnormal; Notable for the following components:    Appearance, UA Hazy (*)     Occult Blood UA Trace (*)     Leukocytes, UA 3+ (*)     All other components within normal limits    Narrative:     Specimen Source->Urine   URINALYSIS MICROSCOPIC - Abnormal; Notable for the following components:    RBC, UA 9 (*)     All other components within normal limits    Narrative:     Specimen Source->Urine   COMPREHENSIVE METABOLIC PANEL   LIPASE          Imaging Results    None          Medications - No data to display  Medical Decision Making  Patient is in no acute distress    Acute kidney injury, dehydration, electrolyte abnormalities, urinary tract infection    MDM    Patient presents for emergent evaluation of acute frequency, dysuria that poses a threat to life and/or bodily function.    In the ED patient found to have acute UTI.     Amount and/or complexity of data reviewed   I ordered labs and personally reviewed them.  Labs significant for 3+ white, normal kidney function.      Social determinants of health - patient was homeless    Discharge MDM and Risk  Patient indeed does have a urinary tract infection.  Kidney function tests within normal limits.  We will start patient on antibiotic therapy.  Patient provided primary care clinic appointment from the ER.  Shared decision-making with the patient regarding diagnosis, treatment, and plan was well received.    Patient was discharged in stable condition.  Detailed return precautions discussed.    Amount and/or Complexity of Data Reviewed  Labs: ordered.  Decision-making details documented in ED Course.    Risk  Prescription drug management.               ED Course as of 05/29/24 2006   Wed May 29, 2024   1624 Lipase: 25 [AP]   1624 CO2: 26 [AP]   1624 Glucose: 92 [AP]   1624 BUN: 17 [AP]   1624 Creatinine: 0.7 [AP]   1624 Calcium: 9.9 [AP]   1624 PROTEIN TOTAL: 7.8 [AP]   1624 BILIRUBIN TOTAL: 0.5 [AP]   1624 ALP: 95 [AP]   1624 ALT: 30 [AP]      ED Course User Index  [AP] Sagar Christian MD                           Clinical Impression:  Final diagnoses:  [N39.0] Urinary tract infection without hematuria, site unspecified (Primary)          ED Disposition Condition    Discharge Stable          ED Prescriptions       Medication Sig Dispense Start Date End Date Auth. Provider    cefUROXime (CEFTIN) 500 MG tablet Take 1 tablet (500 mg total) by mouth every 12 (twelve) hours. for 5 days 10 tablet 5/29/2024 6/3/2024 Sagar Christian MD          Follow-up Information    None          Sagar Christian MD  05/29/24 2006